# Patient Record
Sex: MALE | Race: OTHER | NOT HISPANIC OR LATINO | Employment: UNEMPLOYED | URBAN - METROPOLITAN AREA
[De-identification: names, ages, dates, MRNs, and addresses within clinical notes are randomized per-mention and may not be internally consistent; named-entity substitution may affect disease eponyms.]

---

## 2018-08-30 ENCOUNTER — HOSPITAL ENCOUNTER (EMERGENCY)
Facility: HOSPITAL | Age: 38
Discharge: HOME | End: 2018-08-30
Attending: EMERGENCY MEDICINE

## 2018-08-30 VITALS
TEMPERATURE: 97.3 F | OXYGEN SATURATION: 98 % | RESPIRATION RATE: 18 BRPM | DIASTOLIC BLOOD PRESSURE: 87 MMHG | HEART RATE: 97 BPM | WEIGHT: 212 LBS | SYSTOLIC BLOOD PRESSURE: 155 MMHG

## 2018-08-30 DIAGNOSIS — S61.012A LACERATION OF LEFT THUMB: Primary | ICD-10-CM

## 2018-08-30 PROCEDURE — 90715 TDAP VACCINE 7 YRS/> IM: CPT | Performed by: PHYSICIAN ASSISTANT

## 2018-08-30 PROCEDURE — 90471 IMMUNIZATION ADMIN: CPT

## 2018-08-30 PROCEDURE — 99282 EMERGENCY DEPT VISIT SF MDM: CPT

## 2018-08-30 RX ORDER — ACETAMINOPHEN 325 MG/1
650 TABLET ORAL ONCE
Status: COMPLETED | OUTPATIENT
Start: 2018-08-30 | End: 2018-08-30

## 2018-08-30 RX ADMIN — ACETAMINOPHEN 650 MG: 325 TABLET, FILM COATED ORAL at 08:53

## 2018-08-30 RX ADMIN — TETANUS TOXOID, REDUCED DIPHTHERIA TOXOID AND ACELLULAR PERTUSSIS VACCINE, ADSORBED 0.5 ML: 5; 2.5; 8; 8; 2.5 SUSPENSION INTRAMUSCULAR at 08:52

## 2018-08-30 NOTE — ED PROVIDER NOTES
History  Chief Complaint   Patient presents with    Thumb Laceration     pt attempting to open a can of corn with a knife      Patient is a 57-year-old male with no significant past medical history presents for evaluation of left thumb laceration  He states last night around 11:00 p m  He was opening a can of corn with a knife when he slipped and cut his left thumb  He states there was bleeding initially  There was no foreign bodies  He washed with soap and water in the shower and applied a bandage  He states there was some continued bleeding throughout the night but it has since stopped  He does not recall when his last tetanus was  He denies other injury  There has been no fever, chills, nausea vomiting diarrhea, numbness, weakness  He is right-handed  None       Past Medical History:   Diagnosis Date    Asthma        History reviewed  No pertinent surgical history  History reviewed  No pertinent family history  I have reviewed and agree with the history as documented  Social History   Substance Use Topics    Smoking status: Never Smoker    Smokeless tobacco: Never Used    Alcohol use No        Review of Systems   Constitutional: Negative for chills and fever  Gastrointestinal: Negative for diarrhea, nausea and vomiting  Skin: Positive for wound  Left thumb laceration    Neurological: Negative for weakness and numbness  All other systems reviewed and are negative  Physical Exam  Physical Exam   Constitutional: He is oriented to person, place, and time  He appears well-developed and well-nourished  No distress  HENT:   Head: Normocephalic and atraumatic  Right Ear: External ear normal    Left Ear: External ear normal    Nose: Nose normal    Eyes: Conjunctivae and EOM are normal    Cardiovascular: Normal rate, regular rhythm and normal heart sounds  Exam reveals no gallop and no friction rub  No murmur heard    Pulmonary/Chest: Effort normal and breath sounds normal  No respiratory distress  He has no wheezes  He has no rales  Musculoskeletal: Normal range of motion  Neurological: He is alert and oriented to person, place, and time  Skin: Skin is warm and dry  Capillary refill takes less than 2 seconds  He is not diaphoretic  Left thumb laceration ~1 5cm in length, superficial and linear  No redness, swelling, drainage, or tenderness to indicate infection  No active bleeding  No contamination  FROM of the finger  No tendon involvement  NVI distally  Radial pulse intact  Capillary refill intact  Psychiatric: He has a normal mood and affect  His behavior is normal    Nursing note and vitals reviewed  Vital Signs  ED Triage Vitals [08/30/18 0735]   Temperature Pulse Respirations Blood Pressure SpO2   (!) 97 3 °F (36 3 °C) 97 18 155/87 98 %      Temp Source Heart Rate Source Patient Position - Orthostatic VS BP Location FiO2 (%)   Tympanic -- -- -- --      Pain Score       7           Vitals:    08/30/18 0735   BP: 155/87   Pulse: 97       Visual Acuity      ED Medications  Medications   tetanus-diphtheria-acellular pertussis (BOOSTRIX) IM injection 0 5 mL (0 5 mL Intramuscular Given 8/30/18 0852)   acetaminophen (TYLENOL) tablet 650 mg (650 mg Oral Given 8/30/18 0853)       Diagnostic Studies  Results Reviewed     None                 No orders to display              Procedures  Lac Repair  Date/Time: 8/30/2018 8:20 AM  Performed by: Robert Lynch  Authorized by: Robert Lynch   Consent: Verbal consent obtained    Consent given by: patient  Patient understanding: patient states understanding of the procedure being performed  Patient consent: the patient's understanding of the procedure matches consent given  Procedure consent: procedure consent matches procedure scheduled  Relevant documents: relevant documents present and verified  Site marked: the operative site was marked  Patient identity confirmed: verbally with patient  Body area: upper extremity  Location details: left thumb  Laceration length: 1 5 cm  Tendon involvement: none  Nerve involvement: none  Vascular damage: no  Anesthesia method: none  Procedure Details:  Irrigation solution: saline  Irrigation method: tap  Amount of cleaning: standard  Debridement: none  Degree of undermining: none  Skin closure: glue and Steri-Strips  Approximation: close  Approximation difficulty: simple  Dressing: splint  Patient tolerance: Patient tolerated the procedure well with no immediate complications             Phone Contacts  ED Phone Contact    ED Course                               MDM  Number of Diagnoses or Management Options  Laceration of left thumb:   Diagnosis management comments:   Patient with a 1 5 cm linear superficial thumb laceration which he sustained last night at 11:00 p m  While opening a can of corn with a knife  He is not up-to-date on his tetanus and will update it here  He cleaned the area last night applied a bandage  There is no active bleeding today  No tendon involvement  Neurovascular intact distally  Full range of motion intact  Area was cleaned again with normal saline solution  Skin adhesive and 1 Steri-Strip was applied  A thumb splint was applied  Discussed wound care with the patient  Discussed strict return precautions if symptoms worsen or new symptoms arise as discussed  Patient states understanding and agrees with plan  Patient Progress  Patient progress: stable    CritCare Time    Disposition  Final diagnoses:   Laceration of left thumb     Time reflects when diagnosis was documented in both MDM as applicable and the Disposition within this note     Time User Action Codes Description Comment    8/30/2018  8:36 AM Nicolasa Mccann Add [[id number]] Laceration of left thumb       ED Disposition     ED Disposition Condition Comment    Discharge  Frosty Clines discharge to home/self care      Condition at discharge: Good        Follow-up Information Follow up With Specialties Details Why Contact Info Additional Information    Follow up with your family doctor in 1-2 days as needed          [address] Emergency Department Emergency Medicine  If symptoms worsen or new symptoms arise as discussed  [facility] ED, [address], [facility]          There are no discharge medications for this patient  No discharge procedures on file      ED Provider  Electronically Signed by           Sanjuana Hua PA-C  08/30/18 0828

## 2018-08-30 NOTE — DISCHARGE INSTRUCTIONS
Finger Laceration   WHAT YOU NEED TO KNOW:   A finger laceration is a deep cut in your skin  It is often caused by a sharp object, such as a knife, or blunt force to your finger  Your blood vessels, bones, joints, tendons, or nerves may also be injured  DISCHARGE INSTRUCTIONS:   Return to the emergency department if:   · Your wound comes apart  · Blood soaks through your bandage  · You have severe pain in your finger or hand  · Your finger is pale and cold  · You have sudden trouble moving your finger  · Your swelling suddenly gets worse  · You have red streaks on your skin coming from your wound  Contact your healthcare provider or hand specialist if:   · You have new numbness or tingling  · Your finger feels warm, looks swollen or red, and is draining pus  · You have a fever  · You have questions or concerns about your condition or care  Medicines: You may  need any of the following:  · Antibiotics  help prevent a bacterial infection  · Acetaminophen  decreases pain and fever  It is available without a doctor's order  Ask how much to take and how often to take it  Follow directions  Read the labels of all other medicines you are using to see if they also contain acetaminophen, or ask your doctor or pharmacist  Acetaminophen can cause liver damage if not taken correctly  Do not use more than 4 grams (4,000 milligrams) total of acetaminophen in one day  · Prescription pain medicine  may be given  Ask your healthcare provider how to take this medicine safely  Some prescription pain medicines contain acetaminophen  Do not take other medicines that contain acetaminophen without talking to your healthcare provider  Too much acetaminophen may cause liver damage  Prescription pain medicine may cause constipation  Ask your healthcare provider how to prevent or treat constipation  · Take your medicine as directed    Contact your healthcare provider if you think your medicine is not helping or if you have side effects  Tell him or her if you are allergic to any medicine  Keep a list of the medicines, vitamins, and herbs you take  Include the amounts, and when and why you take them  Bring the list or the pill bottles to follow-up visits  Carry your medicine list with you in case of an emergency  Self-care:   · Apply ice  on your finger for 15 to 20 minutes every hour or as directed  Use an ice pack, or put crushed ice in a plastic bag  Cover it with a towel before you apply it to your skin  Ice helps prevent tissue damage and decreases swelling and pain  · Elevate  your hand above the level of your heart as often as you can  This will help decrease swelling and pain  Prop your hand on pillows or blankets to keep it elevated comfortably  · Wear your splint as directed  A splint will decrease movement and stress on your wound  The splint may help your wound heal faster  Ask your healthcare provider how to apply and remove a splint  · Apply ointments to decrease scarring  Do not apply ointments until your healthcare provider says it is okay  You may need to wait until your wound is healed  Ask which ointment to buy and how often to use it  Wound care:   · Do not get your wound wet until your healthcare provider says it is okay  Do not soak your hand in water  Do not go swimming until your healthcare provider says it is okay  When your healthcare provider says it is okay, carefully wash around the wound with soap and water  Let soap and water run over your wound  Gently pat the area dry or allow it to air dry  · Change your bandages when they get wet, dirty, or after washing  Apply new, clean bandages as directed  Do not apply elastic bandages or tape too tightly  Do not put powders or lotions on your wound  · Apply antibiotic ointment as directed  Your healthcare provider may give you antibiotic ointment to put over your wound if you have stitches   If you have Strips-Strips over your wound, let them dry up and fall off on their own  If they do not fall off within 14 days, gently remove them  If you have glue over your wound, do not remove or pick at it  If your glue comes off, do not replace it with glue that you have at home  · Check your wound every day for signs of infection  Signs of infection include swelling, redness, or pus  Follow up with your healthcare provider or hand specialist in 2 days:  Write down your questions so you remember to ask them during your visits  © 2017 [facility] Information is for End User's use only and may not be sold, redistributed or otherwise used for commercial purposes  All illustrations and images included in CareNotes® are the copyrighted property of [employer]  or Walter Herrmann  The above information is an  only  It is not intended as medical advice for individual conditions or treatments  Talk to your doctor, nurse or pharmacist before following any medical regimen to see if it is safe and effective for you  Skin Adhesive Care   WHAT YOU NEED TO KNOW:   Skin adhesive is medical glue used to close wounds  It is a substitute for staples and stitches  Skin adhesive wound closures take less time and do not require anesthesia  You have less pain and a lower risk of infection than with staples or stitches  Skin adhesive will fall off after the wound is healed  DISCHARGE INSTRUCTIONS:   Self-care:   · Keep your wound clean and dry  for 1 to 5 days  You can shower 24 hours after the skin adhesive is applied  Lightly pat your wound dry after you shower  · Do not soak  your wound in water, such as in a bath or hot tub  · Do not scrub  your wound or pick at the adhesive  This can make your wound reopen  · Do not apply ointments  to your wound  These include antibiotic and other ointments that contain petroleum jelly   These products will remove skin adhesive and reopen your wound  Follow up with your healthcare provider as directed:  Write down your questions so you remember to ask them during your visits  Contact your healthcare provider if:   · You have a fever  · Your wound is red and warm to touch  · You have questions or concerns about your condition or care  Return to the emergency department if:   · Your wound has fluid draining from it  · Your wound opens  © 2017 [address] [employer] Information is for End User's use only and may not be sold, redistributed or otherwise used for commercial purposes  All illustrations and images included in CareNotes® are the copyrighted property of A [employer] M [employer]  or Walter Hermrann  The above information is an  only  It is not intended as medical advice for individual conditions or treatments  Talk to your doctor, nurse or pharmacist before following any medical regimen to see if it is safe and effective for you  Steristrips   WHAT YOU NEED TO KNOW:   Steristrips are sterile pieces of medical tape used to close wounds and help the edges grow back together  Steristrips keep the wound clean and protected while it heals  Steristrips usually fall off on their own in about 7 to 10 days  DISCHARGE INSTRUCTIONS:   Return to the emergency department if:   · You have a fever  · You see red streaks on your skin starting at the wound  · Your wound has a foul smell or is draining fluid or pus  · Your wound is red, swollen, and warm to the touch  · Your wound opens or comes apart  Contact your healthcare provider if:   · The skin under and around the steristrips itches or is not comfortable  · You have questions or concerns about your condition or care  How to use steristrips:  Always wash your hands before you care for your wound  This will help prevent infection  Clean and dry the skin around your wound before you put on new steristrips    · Care for the wound as directed  Do not bathe for 24 hours  After 24 hours, wash around the area gently as directed  Pat the area dry with a clean towel, or let it air dry  Do not rub the area with a towel to dry it  Check the wound for signs of infection, such as swelling or pus  · Only remove the steristrips if directed  Your healthcare provider may want you to remove the steristrips after a certain number of days  Do not remove them early, even if they are causing itching or discomfort  If you are directed to remove the steristrips, pull gently  You might cause the wound to open if you pull hard  Hold the skin down as you slowly and gently remove the strips  · Apply new steristrips as directed  Start at the middle of the wound  Gently bring the edges of the wound together and place the middle of the steristrip on the wound  Do not stretch the steristrip  Smooth the ends of the steristrip down onto your skin  Add more steristrips as needed for the rest of the wound  Leave small gaps between strips so you do not completely cover the wound  Fluid from the wound may build under the strips if you completely cover it  The fluid may make the strips peel  Your healthcare provider may recommend that you add steristrips down the ends of the pieces you placed across the wound  This will help keep the steristrips in place as you move  · Do not scrub or pick at the steristrips  This can cause your wound to open  Trim the edges of the strips if they start to curl  Then press the ends firmly onto your skin  Follow up with your healthcare provider as directed:  Write down your questions so you remember to ask them during your visits  © 2017 [address] [facility] Information is for End User's use only and may not be sold, redistributed or otherwise used for commercial purposes  All illustrations and images included in CareNotes® are the copyrighted property of A D A M [employer] Inc  or Walter Herrmann    The above information is an  only  It is not intended as medical advice for individual conditions or treatments  Talk to your doctor, nurse or pharmacist before following any medical regimen to see if it is safe and effective for you